# Patient Record
Sex: FEMALE | Race: WHITE | NOT HISPANIC OR LATINO | Employment: FULL TIME | URBAN - METROPOLITAN AREA
[De-identification: names, ages, dates, MRNs, and addresses within clinical notes are randomized per-mention and may not be internally consistent; named-entity substitution may affect disease eponyms.]

---

## 2023-10-07 ENCOUNTER — HOSPITAL ENCOUNTER (EMERGENCY)
Facility: MEDICAL CENTER | Age: 48
End: 2023-10-07
Attending: EMERGENCY MEDICINE
Payer: OTHER MISCELLANEOUS

## 2023-10-07 VITALS
RESPIRATION RATE: 16 BRPM | TEMPERATURE: 98 F | DIASTOLIC BLOOD PRESSURE: 79 MMHG | SYSTOLIC BLOOD PRESSURE: 138 MMHG | OXYGEN SATURATION: 96 % | WEIGHT: 161.16 LBS | BODY MASS INDEX: 28.55 KG/M2 | HEART RATE: 82 BPM | HEIGHT: 63 IN

## 2023-10-07 DIAGNOSIS — S01.551A OPEN WOUND OF LIP DUE TO DOG BITE: ICD-10-CM
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
## 2023-10-07 PROCEDURE — 700111 HCHG RX REV CODE 636 W/ 250 OVERRIDE (IP): Performed by: EMERGENCY MEDICINE

## 2023-10-07 PROCEDURE — 303747 HCHG EXTRA SUTURE

## 2023-10-07 PROCEDURE — 90715 TDAP VACCINE 7 YRS/> IM: CPT | Performed by: EMERGENCY MEDICINE

## 2023-10-07 PROCEDURE — 90471 IMMUNIZATION ADMIN: CPT

## 2023-10-07 PROCEDURE — 700102 HCHG RX REV CODE 250 W/ 637 OVERRIDE(OP): Performed by: EMERGENCY MEDICINE

## 2023-10-07 PROCEDURE — 304999 HCHG REPAIR-SIMPLE/INTERMED LEVEL 1

## 2023-10-07 PROCEDURE — 700101 HCHG RX REV CODE 250: Performed by: EMERGENCY MEDICINE

## 2023-10-07 PROCEDURE — 99283 EMERGENCY DEPT VISIT LOW MDM: CPT

## 2023-10-07 PROCEDURE — A9270 NON-COVERED ITEM OR SERVICE: HCPCS | Performed by: EMERGENCY MEDICINE

## 2023-10-07 PROCEDURE — 304217 HCHG IRRIGATION SYSTEM

## 2023-10-07 RX ORDER — LIDOCAINE HYDROCHLORIDE AND EPINEPHRINE BITARTRATE 20; .01 MG/ML; MG/ML
20 INJECTION, SOLUTION SUBCUTANEOUS ONCE
Status: COMPLETED | OUTPATIENT
Start: 2023-10-07 | End: 2023-10-07

## 2023-10-07 RX ORDER — AMOXICILLIN AND CLAVULANATE POTASSIUM 875; 125 MG/1; MG/1
1 TABLET, FILM COATED ORAL 2 TIMES DAILY
Qty: 10 TABLET | Refills: 0 | Status: ACTIVE | OUTPATIENT
Start: 2023-10-07 | End: 2023-10-12

## 2023-10-07 RX ORDER — AMOXICILLIN AND CLAVULANATE POTASSIUM 875; 125 MG/1; MG/1
1 TABLET, FILM COATED ORAL 2 TIMES DAILY
Qty: 10 TABLET | Refills: 0 | Status: ACTIVE | OUTPATIENT
Start: 2023-10-07 | End: 2023-10-07 | Stop reason: SDUPTHER

## 2023-10-07 RX ORDER — AMOXICILLIN AND CLAVULANATE POTASSIUM 875; 125 MG/1; MG/1
1 TABLET, FILM COATED ORAL ONCE
Status: COMPLETED | OUTPATIENT
Start: 2023-10-07 | End: 2023-10-07

## 2023-10-07 RX ADMIN — LIDOCAINE HYDROCHLORIDE,EPINEPHRINE BITARTRATE 20 ML: 20; .01 INJECTION, SOLUTION INFILTRATION; PERINEURAL at 20:00

## 2023-10-07 RX ADMIN — Medication 3 ML: at 19:58

## 2023-10-07 RX ADMIN — CLOSTRIDIUM TETANI TOXOID ANTIGEN (FORMALDEHYDE INACTIVATED), CORYNEBACTERIUM DIPHTHERIAE TOXOID ANTIGEN (FORMALDEHYDE INACTIVATED), BORDETELLA PERTUSSIS TOXOID ANTIGEN (GLUTARALDEHYDE INACTIVATED), BORDETELLA PERTUSSIS FILAMENTOUS HEMAGGLUTININ ANTIGEN (FORMALDEHYDE INACTIVATED), BORDETELLA PERTUSSIS PERTACTIN ANTIGEN, AND BORDETELLA PERTUSSIS FIMBRIAE 2/3 ANTIGEN 0.5 ML: 5; 2; 2.5; 5; 3; 5 INJECTION, SUSPENSION INTRAMUSCULAR at 19:59

## 2023-10-07 RX ADMIN — AMOXICILLIN AND CLAVULANATE POTASSIUM 1 TABLET: 875; 125 TABLET, FILM COATED ORAL at 19:58

## 2023-10-07 NOTE — LETTER
"  FORM C-4:  EMPLOYEE’S CLAIM FOR COMPENSATION/ REPORT OF INITIAL TREATMENT  EMPLOYEE’S CLAIM - PROVIDE ALL INFORMATION REQUESTED   First Name Marycruz Last Name Mohamud Birthdate 1975  Sex female Claim Number   Home Address [id number]   [place]             [place] [address]                                   Age  48 y.o. Height  1.6 m (5' 3\") Weight  73.1 kg (161 lb 2.5 oz) [facility]     Mailing Address [id number]  [place]              [place] [address] Telephone  [phone] (work) Primary Language Spoken  English   Insurer   Third Party    Employee's Occupation (Job Title) When Injury or Occupational Disease Occurred     Employer's Name [employer] Telephone [phone]    Employer Address [address] [[address]] Zip [address]   Date of Injury  10/7/2023       Hour of Injury  4:00 PM Date Employer Notified  10/7/2023 Last Day of Work after Injury or Occupational Disease  10/7/2023 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 (if applicable) restrainig an agressive dog    How did this injury or occupational disease occur? Be specific and answer in detail. Use additional sheet if necessary)  handle/ restrainig very agressive dog   If you believe that you have an occupational disease, when did you first have knowledge of the disability and it relationship to your employment? na Witnesses to the Accident  [place]   Nature of Injury or Occupational Disease  Workers' Compensation Part(s) of Body Injured or Affected  Mouth, N/A, N/A    I CERTIFY THAT THE ABOVE IS TRUE AND CORRECT TO THE BEST OF MY KNOWLEDGE AND THAT I HAVE PROVIDED THIS INFORMATION IN ORDER TO OBTAIN THE BENEFITS OF [place] IN[place] OR " [place]).  I HEREBY AUTHORIZE ANY PHYSICIAN, CHIROPRACTOR, SURGEON, PRACTITIONER, OR OTHER PERSON, ANY HOSPITAL, INCLUDING [facility] OR [facility], ANY MEDICAL SERVICE ORGANIZATION, ANY INSURANCE COMPANY, OR OTHER INSTITUTION OR ORGANIZATION TO RELEASE TO EACH OTHER, ANY MEDICAL OR OTHER INFORMATION, INCLUDING BENEFITS PAID OR PAYABLE, PERTINENT TO THIS INJURY OR DISEASE, EXCEPT INFORMATION RELATIVE TO DIAGNOSIS, TREATMENT AND/OR COUNSELING FOR AIDS, PSYCHOLOGICAL CONDITIONS, ALCOHOL OR CONTROLLED SUBSTANCES, FOR WHICH I MUST GIVE SPECIFIC AUTHORIZATION.  A PHOTOSTAT OF THIS AUTHORIZATION SHALL BE AS VALID AS THE ORIGINAL.  Date:10/07/2023                  Place: [facility]                                             Employee’s Signature   THIS REPORT MUST BE COMPLETED AND MAILED WITHIN 3 WORKING DAYS OF TREATMENT   Place [facility], EMERGENCY DEPT                       Name of Facility [facility]   Date  10/7/2023 Diagnosis  (S01.551A,  W54.0XXA) Open wound of lip due to dog bite Is there evidence the injured employee was under the influence of alcohol and/or another controlled substance at the time of accident?   Hour  10:07 PM Description of Injury or Disease  Open wound of lip due to dog bite No   Treatment  Four fast-absorbing gut sutures used to close the wound.  The patient is started on a 5-day course of Augmentin, she will need occupational health follow-up for wound recheck during the week  Have you advised the patient to remain off work five days or more?         No   X-Ray Findings    Comments:n/a If Yes   From Date    To Date      From information given by the employee, together with medical evidence, can you directly connect this injury or occupational disease as job incurred? Yes If No, is employee capable of: Full Duty  Yes Modified Duty      Is additional medical care by a physician indicated? Yes  Comments:Wound check in  "occupational health clinic If Modified Duty, Specify any Limitations / Restrictions       Do you know of any previous injury or disease contributing to this condition or occupational disease? No    Date 10/7/2023 Print Doctor’s Name Steve Ward certify the employer’s copy of this form was mailed on:   Address [address] [place] [address]  [place] [id number] [phone] INSURER’S USE ONLY   Provider’s Tax ID Number [id number] Telephone Dept: [phone]    Doctor’s Signature angel-STEVE Rowe M.D. Degree        Form C-4 (rev.10/07)                                                                         BRIEF DESCRIPTION OF RIGHTS AND BENEFITS  (Pursuant to [place] 616[place])    Notice of Injury or Occupational Disease (Incident Report Form C-1): If an injury or occupational disease (OD) arises out of and in the course of employment, you must provide written notice to your employer as soon as practicable, but no later than 7 days after the accident or OD. Your employer shall maintain a sufficient supply of the required forms.    Claim for Compensation (Form C-4): If medical treatment is sought, the form C-4 is available at the place of initial treatment. A completed \"Claim for Compensation\" (Form C-4) must be filed within 90 days after an accident or OD. The treating physician or chiropractor must, within 3 working days after treatment, complete and mail to the employer, the employer's insurer and third-party , the Claim for Compensation.    Medical Treatment: If you require medical treatment for your on-the-job injury or OD, you may be required to select a physician or chiropractor from a list provided by your workers’ compensation insurer, if it has contracted with an Organization for Managed Care (MCO) or Preferred Provider Organization (PPO) or providers of health care. If your employer has not entered into a contract with an MCO or PPO, you may select a physician or chiropractor from the Panel of Physicians " and Chiropractors. Any medical costs related to your industrial injury or OD will be paid by your insurer.    Temporary Total Disability (TTD): If your doctor has certified that you are unable to work for a period of at least 5 consecutive days, or 5 cumulative days in a 20-day period, or places restrictions on you that your employer does not accommodate, you may be entitled to TTD compensation.    Temporary Partial Disability (TPD): If the wage you receive upon reemployment is less than the compensation for TTD to which you are entitled, the insurer may be required to pay you TPD compensation to make up the difference. TPD can only be paid for a maximum of 24 months.    Permanent Partial Disability (PPD): When your medical condition is stable and there is an indication of a PPD as a result of your injury or OD, within 30 days, your insurer must arrange for an evaluation by a rating physician or chiropractor to determine the degree of your PPD. The amount of your PPD award depends on the date of injury, the results of the PPD evaluation, your age and wage.    Permanent Total Disability (PTD): If you are medically certified by a treating physician or chiropractor as permanently and totally disabled and have been granted a PTD status by your insurer, you are entitled to receive monthly benefits not to exceed 66 2/3% of your average monthly wage. The amount of your PTD payments is subject to reduction if you previously received a lump-sum PPD award.    Vocational Rehabilitation Services: You may be eligible for vocational rehabilitation services if you are unable to return to the job due to a permanent physical impairment or permanent restrictions as a result of your injury or occupational disease.    Transportation and Per Heather Reimbursement: You may be eligible for travel expenses and per heather associated with medical treatment.    Reopening: You may be able to reopen your claim if your condition worsens after claim  closure.     Appeal Process: If you disagree with a written determination issued by the insurer or the insurer does not respond to your request, you may appeal to the Department of Administration, , by following the instructions contained in your determination letter. You must appeal the determination within 70 days from the date of the determination letter at [address], [address], [place], [place] [address], or [address], [address], [place], [place] [address]. If you disagree with the  decision, you may appeal to the Department of Administration, . You must file your appeal within 30 days from the date of the  decision letter at [address], [address], [place], [place] [address], or [address], [facility] [address] [facility] [address], [place], [place] [address]. If you disagree with a decision of an , you may file a petition for judicial review with the District Court. You must do so within 30 days of the Appeal Officer’s decision. You may be represented by an  at your own expense or you may contact the [facility] for possible representation.    [place]  for Injured Workers (NAIW): If you disagree with a  decision, you may request that NAIW represent you without charge at an  Hearing. For information regarding denial of benefits, you may contact the [facility] at: [address], [address], [place] [address], [phone], or [address], [address], [place] [address], [phone]    To File a Complaint with the Division: If you wish to file a complaint with the  of the Division of Industrial Relations (DIR),  please contact the Workers’ Compensation Section, [address], [address], [place], [place] [address], telephone ([phone], or [address], [place], [place] [address], telephone [phone].    For assistance with Workers’  Compensation Issues: You may contact the [facility] Office for Consumer Health Assistance, [address] [facility] [facility], [address], [place] [address], Toll Free 1-910.227.7665, Web site: http://[facility].[place].gov/Programs/SLOAN E-mail: sloan@[facility].[place].gov  D-2 (rev. 10/20)              __________________________________________________________________                                    _________________            Employee Name / Signature                                                                                                                            Date

## 2023-10-08 NOTE — ED NOTES
Patient remains comfortable on gurney, no identifiable needs at this time. Equal chest rise and fall bilaterally, pt connected to cardiac monitor. Pending discharge r/t workers comp. Safety measures in place, call light within reach.

## 2023-10-08 NOTE — ED TRIAGE NOTES
Chief Complaint   Patient presents with    Dog Bite     Patient bit by a dog at work tonight in the lip. 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 Patient reports dog is not up to date on shots.

## 2023-10-08 NOTE — ED PROVIDER NOTES
"ED Provider Note    CHIEF COMPLAINT  Chief Complaint   Patient presents with    Dog Bite     Patient bit by a dog at work tonight in the lip. 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 Patient reports dog is not up to date on shots.        HPI/ROS    Marycruz Mallory is a 48 y.o. female who presents to the emergency department with a dog bite to the upper lip.  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  She was wearing a mask when this happened but the bite still caused a laceration to the middle of her upper lip.  The dog is in the vets office this evening and can be monitored and it is thought that its shots are not up-to-date.  Injury is isolated to the upper lip with 1 tiny abrasion to the chin.    PAST MEDICAL HISTORY   Generally healthy individual with no chronic medical diagnoses    SURGICAL HISTORY  patient denies any surgical history    FAMILY HISTORY  History reviewed. No pertinent family history.    SOCIAL HISTORY  Social History     Tobacco Use    Smoking status: Every Day     Types: Cigarettes    Smokeless tobacco: Never   Vaping Use    Vaping Use: Never used   Substance and Sexual Activity    Alcohol use: Not Currently    Drug use: Never    Sexual activity: Not on file       CURRENT MEDICATIONS  Home Medications       Reviewed by Marsha Belle R.N. (Registered Nurse) on 10/07/23 at 1858  Med List Status: Partial     Medication Last Dose Status        Patient Mitch Taking any Medications                           ALLERGIES  Not on File    PHYSICAL EXAM  VITAL SIGNS: BP (!) 146/82   Pulse 91   Temp 36.4 °C (97.5 °F) (Temporal)   Resp 16   Ht 1.6 m (5' 3\")   Wt 73.1 kg (161 lb 2.5 oz)   SpO2 94%   BMI 28.55 kg/m²    Constitutional: Awake lucid verbal pleasant individual she does not appear toxic or distressed  HENT: There is an injury to the midline of the upper lip with some " maceration of the tissue, it is not full-thickness and the vermilion border is just barely intact.  There are some tiny nicks on the left upper lip and on the inside of the left lip but I do not think any of these are full-thickness lacerations.  There is a very tiny nick on the lower left chin.  Only the central laceration is suturable.  It is 1 cm in total length but somewhat stellate and macerated    PROCEDURES  L ET was placed on the wound for 20 minutes to initiate anesthesia.  I then locally infiltrated the laceration with lidocaine and epinephrine. The wound was then irrigated with copious amounts of normal saline by the  tech.  The wound was inspected and found to be clean and superficial with no visible contamination. Using a sterile field and sterile technique, 4, 5-0 fast absorbing gut, sutures were placed to close the wound. Suture and wound care instructions were discussed with the patient.       COURSE & MEDICAL DECISION MAKING  In the emergency department the patient's tetanus booster was updated and she was started on oral Augmentin.  I have discussed whether or not to initiate rabies vaccination with her and she has declined at this time.  I think this is a reasonable decision given that this is a domestic dog and is in the custody of the vet and can be observed and this was not an unprovoked bite.  The patient's wound was closed and the sutures are absorbable I have advised her they should fall out within 7 days if they have not she is to return here or see her doctor for suture removal.  This is a work-related injury so I filled out a C4 work injury form for this patient and have advised her to call the occupational health clinic Monday morning to arrange wound check as soon as possible during the week.  I have written her a 5-day prescription for Augmentin.  If at any point in time the area is red or swollen or has pus or discharge or problems healing she is to return immediately here for  recheck.      FINAL DIAGNOSIS  1. Open wound of lip due to dog bite           Electronically signed by: Steve Ward M.D., 10/7/2023 8:50 PM

## 2023-10-08 NOTE — ED NOTES
Patient discharged from [facility] ED to home with self. Discharge teaching completed at bedside and patient signature obtained. All questions and concerns addressed. All pt belongings with pt at time of discharge. Copy of workers comp provided to pt and work note.

## 2023-10-08 NOTE — DISCHARGE INSTRUCTIONS
Keep the area clean and dry.  If you have any problems healing, redness swelling pus or discharge from the wounds return immediately for recheck.  The sutures are absorbable and should fall out within 7 days.  If they have not fallen out in 7 days return here or see your doctor for suture removal.

## 2023-10-17 ENCOUNTER — OCCUPATIONAL MEDICINE (OUTPATIENT)
Dept: OCCUPATIONAL MEDICINE | Facility: CLINIC | Age: 48
End: 2023-10-17
Payer: OTHER MISCELLANEOUS

## 2023-10-17 VITALS
HEIGHT: 63 IN | SYSTOLIC BLOOD PRESSURE: 128 MMHG | TEMPERATURE: 96.8 F | RESPIRATION RATE: 14 BRPM | DIASTOLIC BLOOD PRESSURE: 80 MMHG | WEIGHT: 161 LBS | BODY MASS INDEX: 28.53 KG/M2
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
## 2023-10-17 DIAGNOSIS — S01.551A OPEN WOUND OF LIP DUE TO DOG BITE: ICD-10-CM

## 2023-10-17 PROCEDURE — 99213 OFFICE O/P EST LOW 20 MIN: CPT | Performed by: NURSE PRACTITIONER

## 2023-10-17 PROCEDURE — 3074F SYST BP LT 130 MM HG: CPT | Performed by: NURSE PRACTITIONER

## 2023-10-17 PROCEDURE — 3079F DIAST BP 80-89 MM HG: CPT | Performed by: NURSE PRACTITIONER

## 2023-10-17 NOTE — PROGRESS NOTES
"Subjective:     Marycruz Mallory is a 48 y.o. 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  She was wearing a mask when this happened but the bite still caused a laceration to the middle of her upper lip.  Today patient states symptoms have improved.  She does have a small scab over her upper lip.  She is using the ointment to help with the itching.  Otherwise she denies signs or symptoms of infection.  She states the sutures fell out a couple days after being placed.  She completed the Augmentin with minimal difficulty.  She has been able to tolerate work with minimal difficulty.  She will be released from care at this time.    ROS: All systems were reviewed on intake form, form was reviewed and signed. See scanned documents in media. Pertinent positives and negatives included in HPI.    PMH: No pertinent past medical history to this problem  MEDS: Medications were reviewed in Epic  ALLERGIES: Not on File  SOCHX: Works as  at [employer]  FH: No pertinent family history to this problem       Objective:     /80   Temp 36 °C (96.8 °F) (Temporal)   Resp 14   Ht 1.6 m (5' 3\")   Wt 73 kg (161 lb)   BMI 28.52 kg/m²     [unfilled]    Normal: Well-healed midline of the upper lip with some scabbing.  Well-healed tiny nicks on the left upper lip.  Negative edema, erythema, foul discharge, or open wounds noted.    Assessment/Plan:       1. Open wound of lip due to dog bite    Released to Full Duty FROM 10/17/2023 TO       Discharged/MMI  Released to full duty  Follow-up if needed    Differential diagnosis, natural history, supportive care, and indications for immediate follow-up discussed.    Approximately 25 minutes were spent in reviewing notes, preparing for visit, " obtaining history, exam and evaluation, patient counseling/education and post visit documentation/orders.

## 2023-10-17 NOTE — LETTER
[address],   [address] [place] [address]  Phone:  [phone] - Fax:  [phone]   Occupational Health [facility] Progress Report and Disability Certification  Date of Service: 10/17/2023   No Show:  No  Date / Time of Next Visit:  Discharged/MMI  Released to full duty   Claim Information   Patient Name: Marycruz Mallory  Claim Number:     Employer:    Date of Injury: 10/7/2023     Insurer / TPA: [employer] Workers [employer]  ID / SSN:     Occupation:   Diagnosis: The encounter diagnosis was Open wound of lip due to dog bite.    Medical Information   Related to Industrial Injury? 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  She was wearing a mask when this happened but the bite still caused a laceration to the middle of her upper lip.  Today patient states symptoms have improved.  She does have a small scab over her upper lip.  She is using the ointment to help with the itching.  Otherwise she denies signs or symptoms of infection.  She states the sutures fell out a couple days after being placed.  She completed the Augmentin with minimal difficulty.  She has been able to tolerate work with minimal difficulty.  She will be released from care at this time.   Objective Findings: Normal: Well-healed midline of the upper lip with some scabbing.  Well-healed tiny nicks on the left upper lip.  Negative edema, erythema, foul discharge, or open wounds noted.   Pre-Existing Condition(s):     Assessment:   Condition Improved    Status: Discharged /  MMI  Permanent Disability:No    Plan:      Diagnostics:      Comments:  Discharged/MMI  Released to full duty  Follow-up if needed    Disability Information   Status: Released to Full Duty    From:  10/17/2023  Through:   Restrictions are:     Physical  Restrictions   Sitting:    Standing:    Stooping:    Bending:      Squatting:    Walking:    Climbing:    Pushing:      Pulling:    Other:    Reaching Above Shoulder (L):   Reaching Above Shoulder (R):       Reaching Below Shoulder (L):    Reaching Below Shoulder (R):      Not to exceed Weight Limits   Carrying(hrs):   Weight Limit(lb):   Lifting(hrs):   Weight  Limit(lb):     Comments:      Repetitive Actions   Hands: i.e. Fine Manipulations from Grasping:     Feet: i.e. Operating Foot Controls:     Driving / Operate Machinery:     Health Care Provider’s Original or Electronic Signature  [facility] Health Care Provider’s Original or Electronic Signature    Daniel Buckley DO MPH     Clinic Name / Location: [address],   [address] [place] [id number] Clinic Phone Number: Dept: [phone]   Appointment Time: 11:15 Am Visit Start Time: 11:15 AM   Check-In Time:  11:15 Am Visit Discharge Time:  11:50 am   Original-Treating Physician or Chiropractor    Page 2-Insurer/TPA    Page 3-Employer    Page 4-Employee

## 2024-05-21 ENCOUNTER — APPOINTMENT (OUTPATIENT)
Dept: MEDICAL GROUP | Facility: PHYSICIAN GROUP | Age: 49
End: 2024-05-21

## 2024-12-27 ENCOUNTER — OFFICE VISIT (OUTPATIENT)
Dept: URGENT CARE | Facility: PHYSICIAN GROUP | Age: 49
End: 2024-12-27
Payer: COMMERCIAL

## 2024-12-27 VITALS
HEIGHT: 64 IN | HEART RATE: 90 BPM | RESPIRATION RATE: 16 BRPM | WEIGHT: 167 LBS | DIASTOLIC BLOOD PRESSURE: 70 MMHG | TEMPERATURE: 99 F | BODY MASS INDEX: 28.51 KG/M2 | SYSTOLIC BLOOD PRESSURE: 112 MMHG | OXYGEN SATURATION: 96 %

## 2024-12-27 DIAGNOSIS — M79.10 MYALGIA DUE TO VIRAL INFECTION: ICD-10-CM

## 2024-12-27 DIAGNOSIS — B97.89 MYALGIA DUE TO VIRAL INFECTION: ICD-10-CM

## 2024-12-27 DIAGNOSIS — J00 ACUTE NASOPHARYNGITIS: ICD-10-CM

## 2024-12-27 LAB
FLUAV RNA SPEC QL NAA+PROBE: NEGATIVE
FLUBV RNA SPEC QL NAA+PROBE: NEGATIVE
RSV RNA SPEC QL NAA+PROBE: NEGATIVE
S PYO DNA SPEC NAA+PROBE: NOT DETECTED
SARS-COV-2 RNA RESP QL NAA+PROBE: NEGATIVE

## 2024-12-27 PROCEDURE — 3078F DIAST BP <80 MM HG: CPT

## 2024-12-27 PROCEDURE — 99214 OFFICE O/P EST MOD 30 MIN: CPT

## 2024-12-27 PROCEDURE — 0241U POCT CEPHEID COV-2, FLU A/B, RSV - PCR: CPT

## 2024-12-27 PROCEDURE — 3074F SYST BP LT 130 MM HG: CPT

## 2024-12-27 PROCEDURE — 87651 STREP A DNA AMP PROBE: CPT

## 2024-12-27 RX ORDER — CYCLOBENZAPRINE HCL 5 MG
5-10 TABLET ORAL 2 TIMES DAILY PRN
Qty: 10 TABLET | Refills: 0 | Status: SHIPPED | OUTPATIENT
Start: 2024-12-27 | End: 2025-01-01

## 2024-12-27 RX ORDER — BUPRENORPHINE HYDROCHLORIDE AND NALOXONE HYDROCHLORIDE DIHYDRATE 2; .5 MG/1; MG/1
TABLET SUBLINGUAL
COMMUNITY
Start: 2024-12-05

## 2024-12-27 RX ORDER — CITALOPRAM HYDROBROMIDE 40 MG/1
40 TABLET ORAL DAILY
COMMUNITY
Start: 2024-10-27

## 2024-12-27 RX ORDER — GABAPENTIN 600 MG/1
600 TABLET ORAL 3 TIMES DAILY
COMMUNITY
Start: 2024-12-03

## 2024-12-27 RX ORDER — BUPROPION HYDROCHLORIDE 100 MG/1
100 TABLET, EXTENDED RELEASE ORAL EVERY MORNING
COMMUNITY
Start: 2024-12-22

## 2024-12-27 ASSESSMENT — ENCOUNTER SYMPTOMS
SHORTNESS OF BREATH: 0
DIZZINESS: 0
ABDOMINAL PAIN: 0
DIARRHEA: 0
NECK PAIN: 0
HOARSE VOICE: 0
FOCAL WEAKNESS: 0
CHILLS: 1
FALLS: 0
SORE THROAT: 1
COUGH: 1
SINUS PAIN: 1
SPUTUM PRODUCTION: 1
BLURRED VISION: 0
VOMITING: 0
PALPITATIONS: 0
SWOLLEN GLANDS: 0
DIAPHORESIS: 0
LOSS OF CONSCIOUSNESS: 0
MYALGIAS: 1
FEVER: 0
WEAKNESS: 1
NAUSEA: 0
HEADACHES: 1
SINUS PRESSURE: 1

## 2024-12-27 NOTE — PROGRESS NOTES
Subjective:     Marycruz Mallory is a 49 y.o. female who presents for Sinusitis (X 3 DAYS )      Sinusitis  This is a new problem. The current episode started in the past 7 days. The problem has been gradually worsening since onset. There has been no fever. Her pain is at a severity of 8/10. The pain is mild. Associated symptoms include chills, congestion, coughing, headaches, sinus pressure and a sore throat. Pertinent negatives include no diaphoresis, ear pain, hoarse voice, neck pain, shortness of breath, sneezing or swollen glands. Past treatments include acetaminophen, lying down, nasal decongestants and oral decongestants. The treatment provided no relief.       Review of Systems   Constitutional:  Positive for chills and malaise/fatigue. Negative for diaphoresis and fever.   HENT:  Positive for congestion, sinus pressure, sinus pain and sore throat. Negative for ear discharge, ear pain, hoarse voice and sneezing.    Eyes:  Negative for blurred vision.   Respiratory:  Positive for cough and sputum production. Negative for shortness of breath.    Cardiovascular:  Negative for chest pain, palpitations and leg swelling.   Gastrointestinal:  Negative for abdominal pain, diarrhea, nausea and vomiting.   Musculoskeletal:  Positive for myalgias. Negative for falls and neck pain.   Skin:  Negative for itching and rash.   Neurological:  Positive for weakness and headaches. Negative for dizziness, focal weakness and loss of consciousness.        CURRENT MEDICATIONS:  buprenorphine-naloxone Subl  buPROPion SR Tb12  citalopram Tabs  gabapentin    Allergies:   No Known Allergies    Current Problems: Marycruz Mallory does not have a problem list on file.  Past Surgical Hx:  No past surgical history on file.   Past Social Hx:  reports that she has been smoking cigarettes. She has never used smokeless tobacco. She reports that she does not currently use alcohol. She reports that she does not use drugs.   Past Family Hx:  "Marycruz Mallory family history is not on file.     (Allergies, Medications, & Tobacco/Substance Use were reconciled by the Medical Assistant and reviewed by myself. The family history is prepopulated)       Objective:     /70 (BP Location: Right arm, Patient Position: Sitting, BP Cuff Size: Adult)   Pulse 90   Temp 37.2 °C (99 °F) (Temporal)   Resp 16   Ht 1.626 m (5' 4\")   Wt 75.8 kg (167 lb)   SpO2 96%   BMI 28.67 kg/m²     Physical Exam  Constitutional:       General: She is not in acute distress.     Appearance: Normal appearance. She is ill-appearing. She is not toxic-appearing or diaphoretic.   HENT:      Head: Normocephalic and atraumatic.      Nose: Congestion and rhinorrhea present.      Mouth/Throat:      Pharynx: Posterior oropharyngeal erythema present. No oropharyngeal exudate.   Eyes:      General: No scleral icterus.        Right eye: No discharge.         Left eye: No discharge.   Cardiovascular:      Rate and Rhythm: Normal rate and regular rhythm.      Heart sounds: Normal heart sounds. No murmur heard.     No friction rub. No gallop.   Pulmonary:      Effort: Pulmonary effort is normal. No respiratory distress.      Breath sounds: No stridor. No wheezing, rhonchi or rales.   Chest:      Chest wall: No tenderness.   Abdominal:      General: Abdomen is flat.      Palpations: Abdomen is soft.   Musculoskeletal:         General: Normal range of motion.      Cervical back: No rigidity.   Skin:     General: Skin is warm and dry.   Neurological:      General: No focal deficit present.      Mental Status: She is alert and oriented to person, place, and time. Mental status is at baseline.   Psychiatric:         Behavior: Behavior normal.         Judgment: Judgment normal.         Assessment/Plan:     Marycruz was seen today for sinusitis.    Diagnoses and all orders for this visit:    Acute nasopharyngitis  -     POCT CoV-2, Flu A/B, RSV by PCR  -     POCT GROUP A STREP, PCR    Myalgia due to " viral infection  -     cyclobenzaprine (FLEXERIL) 5 mg tablet; Take 1-2 Tablets by mouth 2 times a day as needed for Muscle Spasms or Moderate Pain for up to 5 days.       Based on history presenting illness, review of systems and physical exam findings, this is likely common cold, with significant myalgias.  Discussed symptomatic treatment and management with Flexeril for relief of her symptoms.  Discussed the risks and benefits indications of this new medication and answered all questions.  Discussed strict return and ED precautions.  Vital signs are stable and no red flag symptoms.  All questions were answered.    Differential diagnosis, natural history, supportive care, and indications for immediate follow-up discussed.    Advised the patient to follow-up with the primary care physician for recheck, reevaluation, and consideration of further management.    Please note that this dictation was created using voice recognition software. I have made reasonable attempt to correct obvious errors, but I expect that there are errors of grammar and possibly content that I did not discover before finalizing the note.    This note was electronically signed by Silvia Priest MD PhD